# Patient Record
Sex: MALE | Race: WHITE | NOT HISPANIC OR LATINO | Employment: OTHER | URBAN - NONMETROPOLITAN AREA
[De-identification: names, ages, dates, MRNs, and addresses within clinical notes are randomized per-mention and may not be internally consistent; named-entity substitution may affect disease eponyms.]

---

## 2023-12-27 ENCOUNTER — OFFICE VISIT (OUTPATIENT)
Dept: PRIMARY CARE | Facility: CLINIC | Age: 78
End: 2023-12-27
Payer: MEDICARE

## 2023-12-27 ENCOUNTER — LAB (OUTPATIENT)
Dept: LAB | Facility: LAB | Age: 78
End: 2023-12-27
Payer: MEDICARE

## 2023-12-27 VITALS
DIASTOLIC BLOOD PRESSURE: 72 MMHG | OXYGEN SATURATION: 98 % | WEIGHT: 176.8 LBS | TEMPERATURE: 98.4 F | HEART RATE: 82 BPM | BODY MASS INDEX: 25.37 KG/M2 | SYSTOLIC BLOOD PRESSURE: 138 MMHG

## 2023-12-27 DIAGNOSIS — J06.9 VIRAL UPPER RESPIRATORY TRACT INFECTION: ICD-10-CM

## 2023-12-27 DIAGNOSIS — J02.9 PHARYNGITIS, UNSPECIFIED ETIOLOGY: Primary | ICD-10-CM

## 2023-12-27 PROCEDURE — 1126F AMNT PAIN NOTED NONE PRSNT: CPT | Performed by: FAMILY MEDICINE

## 2023-12-27 PROCEDURE — 1036F TOBACCO NON-USER: CPT | Performed by: FAMILY MEDICINE

## 2023-12-27 PROCEDURE — 99213 OFFICE O/P EST LOW 20 MIN: CPT | Performed by: FAMILY MEDICINE

## 2023-12-27 PROCEDURE — 87631 RESP VIRUS 3-5 TARGETS: CPT

## 2023-12-27 PROCEDURE — 87081 CULTURE SCREEN ONLY: CPT

## 2023-12-27 PROCEDURE — 1159F MED LIST DOCD IN RCRD: CPT | Performed by: FAMILY MEDICINE

## 2023-12-27 RX ORDER — ATENOLOL 25 MG/1
25 TABLET ORAL EVERY 24 HOURS
COMMUNITY

## 2023-12-27 RX ORDER — MULTIVIT-MIN/FA/LYCOPEN/LUTEIN .4-300-25
TABLET ORAL
COMMUNITY

## 2023-12-27 RX ORDER — CYCLOBENZAPRINE HCL 10 MG
10 TABLET ORAL 3 TIMES DAILY PRN
COMMUNITY
Start: 2023-05-12 | End: 2023-12-27 | Stop reason: ALTCHOICE

## 2023-12-27 ASSESSMENT — ENCOUNTER SYMPTOMS
DEPRESSION: 0
COUGH: 1
SORE THROAT: 1
OCCASIONAL FEELINGS OF UNSTEADINESS: 0
LOSS OF SENSATION IN FEET: 0

## 2023-12-27 ASSESSMENT — PATIENT HEALTH QUESTIONNAIRE - PHQ9
SUM OF ALL RESPONSES TO PHQ9 QUESTIONS 1 AND 2: 0
1. LITTLE INTEREST OR PLEASURE IN DOING THINGS: NOT AT ALL
2. FEELING DOWN, DEPRESSED OR HOPELESS: NOT AT ALL

## 2023-12-27 ASSESSMENT — PAIN SCALES - GENERAL: PAINLEVEL: 0-NO PAIN

## 2023-12-27 NOTE — PROGRESS NOTES
Subjective   Patient ID: Horacio Venegas is a 78 y.o. male who presents for Sore Throat and Cough.    Sore Throat   Associated symptoms include coughing.   Cough  Associated symptoms include a sore throat.      He presents with 2 days of sore throat and a cough.  He has taken 2 home COVID test and both are negative.  Does not have any ear pain.  Minimal nasal congestion.  He has not had a fever.  He is having company in for the holiday and just wants to be sure he is not can spread anything to anybody.    Review of Systems   HENT:  Positive for sore throat.    Respiratory:  Positive for cough.        Objective   /72   Pulse 82   Temp 36.9 °C (98.4 °F)   Wt 80.2 kg (176 lb 12.8 oz)   SpO2 98%   BMI 25.37 kg/m²     Physical Exam  Vitals reviewed.   Constitutional:       Appearance: He is not toxic-appearing.   HENT:      Right Ear: Tympanic membrane and ear canal normal.      Left Ear: Tympanic membrane and ear canal normal.      Nose: No congestion or rhinorrhea.      Mouth/Throat:      Mouth: Mucous membranes are moist.      Pharynx: Oropharynx is clear.   Eyes:      Conjunctiva/sclera: Conjunctivae normal.   Pulmonary:      Effort: Pulmonary effort is normal. No respiratory distress.      Breath sounds: No wheezing or rhonchi.   Musculoskeletal:      Cervical back: No rigidity.   Lymphadenopathy:      Cervical: No cervical adenopathy.   Neurological:      Mental Status: He is alert.   Psychiatric:         Mood and Affect: Mood normal.         Assessment/Plan   Diagnoses and all orders for this visit:  Pharyngitis, unspecified etiology  Viral upper respiratory tract infection  Likely has a viral URI for the last 2 days.  I want to rest at home drink plenty of fluids and keep his stomach warm.  He will let me know Friday if anything worsens going into the weekend.

## 2023-12-27 NOTE — PATIENT INSTRUCTIONS
Your rapid strep test is negative.  Likely have a viral upper respiratory infection.  Rest at home, keep your stomach warm and let me know if anything worsens over the next 2 days.

## 2023-12-28 LAB
FLUAV RNA RESP QL NAA+PROBE: NOT DETECTED
FLUBV RNA RESP QL NAA+PROBE: NOT DETECTED
RSV RNA RESP QL NAA+PROBE: NOT DETECTED

## 2023-12-29 ENCOUNTER — TELEPHONE (OUTPATIENT)
Dept: PRIMARY CARE | Facility: CLINIC | Age: 78
End: 2023-12-29
Payer: MEDICARE

## 2023-12-29 DIAGNOSIS — J40 BRONCHITIS: Primary | ICD-10-CM

## 2023-12-29 RX ORDER — AZITHROMYCIN 250 MG/1
TABLET, FILM COATED ORAL
Qty: 6 TABLET | Refills: 0 | Status: SHIPPED | OUTPATIENT
Start: 2023-12-29 | End: 2024-01-02

## 2023-12-29 NOTE — TELEPHONE ENCOUNTER
Patient had appt with you this week. He was told to call on Friday if he sore throat was not better. He is no better. He is requesting antibiotic, zpack

## 2023-12-30 LAB — S PYO THROAT QL CULT: NORMAL

## 2024-01-29 ENCOUNTER — OFFICE VISIT (OUTPATIENT)
Dept: PRIMARY CARE | Facility: CLINIC | Age: 79
End: 2024-01-29
Payer: MEDICARE

## 2024-01-29 VITALS
HEART RATE: 58 BPM | OXYGEN SATURATION: 98 % | WEIGHT: 177 LBS | SYSTOLIC BLOOD PRESSURE: 132 MMHG | BODY MASS INDEX: 25.4 KG/M2 | DIASTOLIC BLOOD PRESSURE: 68 MMHG

## 2024-01-29 DIAGNOSIS — R42 VERTIGO: Primary | ICD-10-CM

## 2024-01-29 DIAGNOSIS — R51.9 SCALP PAIN: ICD-10-CM

## 2024-01-29 DIAGNOSIS — R73.9 ELEVATED BLOOD SUGAR: ICD-10-CM

## 2024-01-29 PROBLEM — R07.9 CHEST PAIN: Status: ACTIVE | Noted: 2019-08-29

## 2024-01-29 PROBLEM — R10.9 ABDOMINAL PAIN: Status: ACTIVE | Noted: 2024-01-29

## 2024-01-29 PROBLEM — R00.0 TACHYCARDIA: Status: ACTIVE | Noted: 2024-01-29

## 2024-01-29 PROBLEM — D72.821 MONOCYTOSIS: Status: ACTIVE | Noted: 2024-01-29

## 2024-01-29 LAB — POC HEMOGLOBIN A1C: 5.6 % (ref 4.2–6.5)

## 2024-01-29 PROCEDURE — 1036F TOBACCO NON-USER: CPT | Performed by: FAMILY MEDICINE

## 2024-01-29 PROCEDURE — 1126F AMNT PAIN NOTED NONE PRSNT: CPT | Performed by: FAMILY MEDICINE

## 2024-01-29 PROCEDURE — 1159F MED LIST DOCD IN RCRD: CPT | Performed by: FAMILY MEDICINE

## 2024-01-29 PROCEDURE — 99213 OFFICE O/P EST LOW 20 MIN: CPT | Performed by: FAMILY MEDICINE

## 2024-01-29 ASSESSMENT — PATIENT HEALTH QUESTIONNAIRE - PHQ9
1. LITTLE INTEREST OR PLEASURE IN DOING THINGS: NOT AT ALL
SUM OF ALL RESPONSES TO PHQ9 QUESTIONS 1 AND 2: 0
2. FEELING DOWN, DEPRESSED OR HOPELESS: NOT AT ALL

## 2024-01-29 ASSESSMENT — ENCOUNTER SYMPTOMS
OCCASIONAL FEELINGS OF UNSTEADINESS: 0
LOSS OF SENSATION IN FEET: 0
DEPRESSION: 0

## 2024-01-29 ASSESSMENT — PAIN SCALES - GENERAL: PAINLEVEL: 0-NO PAIN

## 2024-01-29 NOTE — PROGRESS NOTES
Subjective   Patient ID: Horacio Venegas is a 78 y.o. male who presents for Dizziness, Headache, and Hypertension.    HPI   He presents today with just to discuss some issues over the weekend.  He states that he is laying down in bed Saturday and suddenly got very dizzy the room was spinning around.  He sat up for a while spent 2 hours in the chair and was able to back to bed.  This happened 1 other time later in the weekend but was very short-lived.  He has not happened since.    He also number weeks ago had some scalp pain.  He states it did not hurt less he touched the scalp and that lasted for a few hours but then resolved on its own.    He was taking his blood pressure and that it did get up into the 140s but then has been down in the 120s to 130s today and late yesterday.    Review of Systems    Objective   /68   Pulse 58   Wt 80.3 kg (177 lb)   SpO2 98%   BMI 25.40 kg/m²     Physical Exam  Constitutional:       Appearance: Normal appearance.   HENT:      Right Ear: Tympanic membrane and ear canal normal.      Left Ear: Tympanic membrane and ear canal normal.   Neck:      Thyroid: No thyromegaly or thyroid tenderness.      Vascular: No carotid bruit.   Cardiovascular:      Rate and Rhythm: Normal rate and regular rhythm.      Heart sounds: No murmur heard.  Pulmonary:      Effort: Pulmonary effort is normal.      Breath sounds: Normal breath sounds.   Musculoskeletal:      Cervical back: Neck supple.   Neurological:      Mental Status: He is alert.   Psychiatric:         Mood and Affect: Mood normal.         Assessment/Plan   Diagnoses and all orders for this visit:  Vertigo  Scalp pain  Elevated blood sugar  It does sound like he was experiencing vertigo given that laying down in the bed with the head change have precipitated it.  Staying upright resolved.  However, if this occurs again, he will call right away.  Also, if the scalp pain turns, he will call.  Returns he will call.  May be able to  resolve that with acupuncture..

## 2024-02-07 ENCOUNTER — OFFICE VISIT (OUTPATIENT)
Dept: PRIMARY CARE | Facility: CLINIC | Age: 79
End: 2024-02-07
Payer: MEDICARE

## 2024-02-07 ENCOUNTER — LAB (OUTPATIENT)
Dept: LAB | Facility: LAB | Age: 79
End: 2024-02-07
Payer: MEDICARE

## 2024-02-07 VITALS
BODY MASS INDEX: 25.65 KG/M2 | OXYGEN SATURATION: 99 % | SYSTOLIC BLOOD PRESSURE: 126 MMHG | WEIGHT: 179.2 LBS | HEART RATE: 57 BPM | HEIGHT: 70 IN | DIASTOLIC BLOOD PRESSURE: 74 MMHG

## 2024-02-07 DIAGNOSIS — Z86.010 PERSONAL HISTORY OF COLONIC POLYPS: ICD-10-CM

## 2024-02-07 DIAGNOSIS — I47.10 SVT (SUPRAVENTRICULAR TACHYCARDIA) (CMS-HCC): ICD-10-CM

## 2024-02-07 DIAGNOSIS — Z00.00 ROUTINE GENERAL MEDICAL EXAMINATION AT HEALTH CARE FACILITY: Primary | ICD-10-CM

## 2024-02-07 DIAGNOSIS — Z12.5 PROSTATE CANCER SCREENING: ICD-10-CM

## 2024-02-07 DIAGNOSIS — Z00.00 ROUTINE GENERAL MEDICAL EXAMINATION AT HEALTH CARE FACILITY: ICD-10-CM

## 2024-02-07 PROBLEM — R10.9 ABDOMINAL PAIN: Status: RESOLVED | Noted: 2024-01-29 | Resolved: 2024-02-07

## 2024-02-07 PROBLEM — R07.9 CHEST PAIN: Status: RESOLVED | Noted: 2019-08-29 | Resolved: 2024-02-07

## 2024-02-07 LAB
ALBUMIN SERPL BCP-MCNC: 4.5 G/DL (ref 3.4–5)
ALP SERPL-CCNC: 80 U/L (ref 33–136)
ALT SERPL W P-5'-P-CCNC: 18 U/L (ref 10–52)
ANION GAP SERPL CALC-SCNC: 11 MMOL/L (ref 10–20)
AST SERPL W P-5'-P-CCNC: 24 U/L (ref 9–39)
BILIRUB SERPL-MCNC: 1.2 MG/DL (ref 0–1.2)
BUN SERPL-MCNC: 25 MG/DL (ref 6–23)
CALCIUM SERPL-MCNC: 9.9 MG/DL (ref 8.6–10.3)
CHLORIDE SERPL-SCNC: 104 MMOL/L (ref 98–107)
CHOLEST SERPL-MCNC: 166 MG/DL (ref 0–199)
CHOLESTEROL/HDL RATIO: 2.2
CO2 SERPL-SCNC: 31 MMOL/L (ref 21–32)
CREAT SERPL-MCNC: 1.05 MG/DL (ref 0.5–1.3)
EGFRCR SERPLBLD CKD-EPI 2021: 73 ML/MIN/1.73M*2
GLUCOSE SERPL-MCNC: 90 MG/DL (ref 74–99)
HDLC SERPL-MCNC: 74.9 MG/DL
LDLC SERPL CALC-MCNC: 78 MG/DL
NON HDL CHOLESTEROL: 91 MG/DL (ref 0–149)
POTASSIUM SERPL-SCNC: 4.7 MMOL/L (ref 3.5–5.3)
PROT SERPL-MCNC: 7.9 G/DL (ref 6.4–8.2)
PSA SERPL-MCNC: 0.93 NG/ML
SODIUM SERPL-SCNC: 141 MMOL/L (ref 136–145)
TRIGL SERPL-MCNC: 66 MG/DL (ref 0–149)
TSH SERPL-ACNC: 2.17 MIU/L (ref 0.44–3.98)
VLDL: 13 MG/DL (ref 0–40)

## 2024-02-07 PROCEDURE — 1160F RVW MEDS BY RX/DR IN RCRD: CPT | Performed by: FAMILY MEDICINE

## 2024-02-07 PROCEDURE — 36415 COLL VENOUS BLD VENIPUNCTURE: CPT

## 2024-02-07 PROCEDURE — 1126F AMNT PAIN NOTED NONE PRSNT: CPT | Performed by: FAMILY MEDICINE

## 2024-02-07 PROCEDURE — 1170F FXNL STATUS ASSESSED: CPT | Performed by: FAMILY MEDICINE

## 2024-02-07 PROCEDURE — 1036F TOBACCO NON-USER: CPT | Performed by: FAMILY MEDICINE

## 2024-02-07 PROCEDURE — 1159F MED LIST DOCD IN RCRD: CPT | Performed by: FAMILY MEDICINE

## 2024-02-07 PROCEDURE — 99215 OFFICE O/P EST HI 40 MIN: CPT | Performed by: FAMILY MEDICINE

## 2024-02-07 PROCEDURE — G0439 PPPS, SUBSEQ VISIT: HCPCS | Performed by: FAMILY MEDICINE

## 2024-02-07 PROCEDURE — G0103 PSA SCREENING: HCPCS

## 2024-02-07 ASSESSMENT — ENCOUNTER SYMPTOMS
ACTIVITY CHANGE: 0
LOSS OF SENSATION IN FEET: 0
DIFFICULTY URINATING: 0
HEADACHES: 0
OCCASIONAL FEELINGS OF UNSTEADINESS: 0
CHEST TIGHTNESS: 0
NERVOUS/ANXIOUS: 0
PALPITATIONS: 0
BLOOD IN STOOL: 0
DYSPHORIC MOOD: 0
DIZZINESS: 0
CONSTIPATION: 0
DIARRHEA: 0
DEPRESSION: 0
COUGH: 0
SHORTNESS OF BREATH: 0

## 2024-02-07 ASSESSMENT — ACTIVITIES OF DAILY LIVING (ADL)
GROCERY_SHOPPING: INDEPENDENT
MANAGING_FINANCES: INDEPENDENT
DOING_HOUSEWORK: INDEPENDENT
DRESSING: INDEPENDENT
BATHING: INDEPENDENT
TAKING_MEDICATION: INDEPENDENT

## 2024-02-07 ASSESSMENT — PAIN SCALES - GENERAL: PAINLEVEL: 0-NO PAIN

## 2024-02-07 ASSESSMENT — LIFESTYLE VARIABLES
HOW MANY STANDARD DRINKS CONTAINING ALCOHOL DO YOU HAVE ON A TYPICAL DAY: PATIENT DOES NOT DRINK
SKIP TO QUESTIONS 9-10: 1
HOW OFTEN DO YOU HAVE A DRINK CONTAINING ALCOHOL: NEVER
AUDIT-C TOTAL SCORE: 0
HOW OFTEN DO YOU HAVE SIX OR MORE DRINKS ON ONE OCCASION: NEVER

## 2024-02-07 NOTE — PATIENT INSTRUCTIONS
Doing well.  If you do get the rapid heartrate, bear down or cough hard, which will help slow your heart rate down.   Keep your follow up for colonoscopy.

## 2024-02-07 NOTE — PROGRESS NOTES
"Subjective   Reason for Visit: Horacio Venegas is an 78 y.o. male here for a Medicare Wellness visit.     Past Medical, Surgical, and Family History reviewed and updated in chart.    Reviewed all medications by prescribing practitioner or clinical pharmacist (such as prescriptions, OTCs, herbal therapies and supplements) and documented in the medical record.    HPI  Here for wellness exam.  Overall, feeling well.  He is no longer having the dizziness.  He gets occasional headache but overall feels well.  No trouble with his medications.  He states he is up-to-date on his colonoscopy, due in a year or 2.    Patient Care Team:  Austyn Hubbard MD as PCP - General (Family Medicine)     Review of Systems   Constitutional:  Negative for activity change.   Respiratory:  Negative for cough, chest tightness and shortness of breath.    Cardiovascular:  Negative for chest pain, palpitations and leg swelling.   Gastrointestinal:  Negative for blood in stool, constipation and diarrhea.   Genitourinary:  Negative for difficulty urinating.   Neurological:  Negative for dizziness and headaches.   Psychiatric/Behavioral:  Negative for dysphoric mood. The patient is not nervous/anxious.        Objective   Vitals:  /74   Pulse 57   Ht 1.778 m (5' 10\")   Wt 81.3 kg (179 lb 3.2 oz)   SpO2 99%   BMI 25.71 kg/m²       Physical Exam  Constitutional:       Appearance: Normal appearance.   HENT:      Right Ear: Tympanic membrane and ear canal normal.      Left Ear: Tympanic membrane and ear canal normal.      Nose: Nose normal.      Mouth/Throat:      Mouth: Mucous membranes are moist.      Pharynx: Oropharynx is clear. No posterior oropharyngeal erythema.   Eyes:      Conjunctiva/sclera: Conjunctivae normal.   Neck:      Thyroid: No thyromegaly or thyroid tenderness.      Vascular: No carotid bruit.   Cardiovascular:      Rate and Rhythm: Normal rate and regular rhythm.      Heart sounds: No murmur heard.  Pulmonary:      " Effort: Pulmonary effort is normal.      Breath sounds: Normal breath sounds.   Musculoskeletal:      Cervical back: Neck supple.   Lymphadenopathy:      Cervical: No cervical adenopathy.   Skin:     General: Skin is warm and dry.   Neurological:      General: No focal deficit present.      Mental Status: He is alert.   Psychiatric:         Mood and Affect: Mood normal.         Assessment/Plan   Problem List Items Addressed This Visit       Personal history of colonic polyps    SVT (supraventricular tachycardia)    Overview     Formatting of this note might be different from the original. Paroxysmal.Controlled on atenolol.          Other Visit Diagnoses       Routine general medical examination at health care facility    -  Primary        Overall, doing well.  He will continue his current medications.  I did instruct him to cough hard or bear down if he does get palpitations or rapid heart rate.

## 2024-04-01 ENCOUNTER — OFFICE VISIT (OUTPATIENT)
Dept: PRIMARY CARE | Facility: CLINIC | Age: 79
End: 2024-04-01
Payer: MEDICARE

## 2024-04-01 VITALS
OXYGEN SATURATION: 98 % | SYSTOLIC BLOOD PRESSURE: 140 MMHG | HEART RATE: 53 BPM | WEIGHT: 182.2 LBS | BODY MASS INDEX: 26.14 KG/M2 | DIASTOLIC BLOOD PRESSURE: 78 MMHG

## 2024-04-01 DIAGNOSIS — H69.91 DYSFUNCTION OF RIGHT EUSTACHIAN TUBE: Primary | ICD-10-CM

## 2024-04-01 PROCEDURE — 1036F TOBACCO NON-USER: CPT | Performed by: FAMILY MEDICINE

## 2024-04-01 PROCEDURE — 99213 OFFICE O/P EST LOW 20 MIN: CPT | Performed by: FAMILY MEDICINE

## 2024-04-01 PROCEDURE — 1125F AMNT PAIN NOTED PAIN PRSNT: CPT | Performed by: FAMILY MEDICINE

## 2024-04-01 PROCEDURE — 1159F MED LIST DOCD IN RCRD: CPT | Performed by: FAMILY MEDICINE

## 2024-04-01 PROCEDURE — 1160F RVW MEDS BY RX/DR IN RCRD: CPT | Performed by: FAMILY MEDICINE

## 2024-04-01 RX ORDER — PREDNISONE 20 MG/1
40 TABLET ORAL DAILY
Qty: 10 TABLET | Refills: 0 | Status: SHIPPED | OUTPATIENT
Start: 2024-04-01 | End: 2024-04-09 | Stop reason: ALTCHOICE

## 2024-04-01 ASSESSMENT — ENCOUNTER SYMPTOMS
DEPRESSION: 0
OCCASIONAL FEELINGS OF UNSTEADINESS: 0
LOSS OF SENSATION IN FEET: 0

## 2024-04-01 ASSESSMENT — PAIN SCALES - GENERAL: PAINLEVEL: 4

## 2024-04-01 ASSESSMENT — LIFESTYLE VARIABLES
SKIP TO QUESTIONS 9-10: 1
AUDIT-C TOTAL SCORE: 0
HOW MANY STANDARD DRINKS CONTAINING ALCOHOL DO YOU HAVE ON A TYPICAL DAY: PATIENT DOES NOT DRINK
HOW OFTEN DO YOU HAVE SIX OR MORE DRINKS ON ONE OCCASION: NEVER
HOW OFTEN DO YOU HAVE A DRINK CONTAINING ALCOHOL: NEVER

## 2024-04-01 ASSESSMENT — PATIENT HEALTH QUESTIONNAIRE - PHQ9
2. FEELING DOWN, DEPRESSED OR HOPELESS: NOT AT ALL
1. LITTLE INTEREST OR PLEASURE IN DOING THINGS: NOT AT ALL
SUM OF ALL RESPONSES TO PHQ9 QUESTIONS 1 AND 2: 0

## 2024-04-01 NOTE — PROGRESS NOTES
Subjective   Patient ID: Horacio Venegas is a 78 y.o. male who presents for Sore Throat and Earache.    Over the last week or more, has had some discomfort in the right side of his neck.  He states when he swallows or opens his mouth pain will come into the neck and go up into his ear.  He states he had this before and he thinks maybe he took a Z-Art for it.  States he has used Flonase in the past which she did not think was effective.  No nasal congestion.  No fever.  Otherwise feeling well.  Had trouble sleeping last night because of the pain.  He states he tried heat on this has been no help.         Review of Systems    Objective   /78   Pulse 53   Wt 82.6 kg (182 lb 3.2 oz)   SpO2 98%   BMI 26.14 kg/m²     Physical Exam  Vitals reviewed.   Constitutional:       Appearance: He is not toxic-appearing.   HENT:      Right Ear: Tympanic membrane and ear canal normal.      Nose: No congestion or rhinorrhea.      Mouth/Throat:      Mouth: Mucous membranes are moist.      Pharynx: Oropharynx is clear.   Eyes:      Conjunctiva/sclera: Conjunctivae normal.   Pulmonary:      Effort: Pulmonary effort is normal.   Lymphadenopathy:      Cervical: No cervical adenopathy.   Neurological:      Mental Status: He is alert.   Psychiatric:         Mood and Affect: Mood normal.         Assessment/Plan   Diagnoses and all orders for this visit:  Dysfunction of right eustachian tube  -     predniSONE (Deltasone) 20 mg tablet; Take 2 tablets (40 mg) by mouth once daily for 5 days.  I believe this is related to eustachian tube dysfunction since when he swallows the pain goes up towards the ear.  Also when he is eating he has problems.  He is wanting the Flonase we will use the prednisone and let me know if that improving over the next 2 or 3 days in which case we could potentially add a Z-Art, which she states is warfarin for this in the past.

## 2024-04-04 ENCOUNTER — TELEPHONE (OUTPATIENT)
Dept: PRIMARY CARE | Facility: CLINIC | Age: 79
End: 2024-04-04
Payer: MEDICARE

## 2024-04-04 DIAGNOSIS — J02.9 PHARYNGITIS, UNSPECIFIED ETIOLOGY: Primary | ICD-10-CM

## 2024-04-04 RX ORDER — AZITHROMYCIN 250 MG/1
TABLET, FILM COATED ORAL
Qty: 6 TABLET | Refills: 0 | Status: SHIPPED | OUTPATIENT
Start: 2024-04-04 | End: 2024-04-09 | Stop reason: ALTCHOICE

## 2024-04-09 ENCOUNTER — OFFICE VISIT (OUTPATIENT)
Dept: PRIMARY CARE | Facility: CLINIC | Age: 79
End: 2024-04-09
Payer: MEDICARE

## 2024-04-09 VITALS
BODY MASS INDEX: 25.2 KG/M2 | WEIGHT: 175.6 LBS | DIASTOLIC BLOOD PRESSURE: 60 MMHG | HEART RATE: 56 BPM | OXYGEN SATURATION: 96 % | TEMPERATURE: 98.5 F | SYSTOLIC BLOOD PRESSURE: 136 MMHG

## 2024-04-09 DIAGNOSIS — R43.9 TASTE DISORDER: ICD-10-CM

## 2024-04-09 DIAGNOSIS — H69.91 DYSFUNCTION OF RIGHT EUSTACHIAN TUBE: Primary | ICD-10-CM

## 2024-04-09 PROCEDURE — 1159F MED LIST DOCD IN RCRD: CPT | Performed by: FAMILY MEDICINE

## 2024-04-09 PROCEDURE — 1125F AMNT PAIN NOTED PAIN PRSNT: CPT | Performed by: FAMILY MEDICINE

## 2024-04-09 PROCEDURE — 1036F TOBACCO NON-USER: CPT | Performed by: FAMILY MEDICINE

## 2024-04-09 PROCEDURE — 99213 OFFICE O/P EST LOW 20 MIN: CPT | Performed by: FAMILY MEDICINE

## 2024-04-09 PROCEDURE — 1160F RVW MEDS BY RX/DR IN RCRD: CPT | Performed by: FAMILY MEDICINE

## 2024-04-09 ASSESSMENT — ENCOUNTER SYMPTOMS
OCCASIONAL FEELINGS OF UNSTEADINESS: 0
LOSS OF SENSATION IN FEET: 0
DEPRESSION: 0

## 2024-04-09 ASSESSMENT — PAIN SCALES - GENERAL: PAINLEVEL: 3

## 2024-04-09 NOTE — PATIENT INSTRUCTIONS
Add flonase, 2 sprays each nostril once a day. Schedule with ENT.   Will see how things go this next week on the Flonase and the finish of the Zpack.

## 2024-04-15 ASSESSMENT — ENCOUNTER SYMPTOMS
SORE THROAT: 1
NECK PAIN: 1
HEADACHES: 1

## 2024-04-19 ENCOUNTER — OFFICE VISIT (OUTPATIENT)
Dept: OTOLARYNGOLOGY | Facility: CLINIC | Age: 79
End: 2024-04-19
Payer: MEDICARE

## 2024-04-19 VITALS — TEMPERATURE: 97.3 F | BODY MASS INDEX: 24.08 KG/M2 | HEIGHT: 70 IN | WEIGHT: 168.2 LBS

## 2024-04-19 DIAGNOSIS — H92.01 RIGHT EAR PAIN: Primary | ICD-10-CM

## 2024-04-19 DIAGNOSIS — Z00.00 HEALTH CARE MAINTENANCE: ICD-10-CM

## 2024-04-19 DIAGNOSIS — R07.0 THROAT PAIN: ICD-10-CM

## 2024-04-19 PROCEDURE — 31575 DIAGNOSTIC LARYNGOSCOPY: CPT | Performed by: OTOLARYNGOLOGY

## 2024-04-19 PROCEDURE — 1160F RVW MEDS BY RX/DR IN RCRD: CPT | Performed by: OTOLARYNGOLOGY

## 2024-04-19 PROCEDURE — 99203 OFFICE O/P NEW LOW 30 MIN: CPT | Performed by: OTOLARYNGOLOGY

## 2024-04-19 PROCEDURE — 1159F MED LIST DOCD IN RCRD: CPT | Performed by: OTOLARYNGOLOGY

## 2024-04-19 PROCEDURE — 1036F TOBACCO NON-USER: CPT | Performed by: OTOLARYNGOLOGY

## 2024-04-19 NOTE — PROGRESS NOTES
"HPI  Horacio Venegas is a 78 y.o. male with about a month of right-sided ear pain and sore throat.  He also has unusual facial pain extending to the head.  This is older.  He has been on antibiotics and prednisone and symptoms persist.  Voice is strong.  Breathing well.  No hemoptysis.      History reviewed. No pertinent past medical history.         Medications:     Current Outpatient Medications:     aspirin 81 mg capsule, Take 81 mg by mouth once every 24 hours., Disp: , Rfl:     atenolol (Tenormin) 25 mg tablet, Take 1 tablet (25 mg) by mouth once every 24 hours., Disp: , Rfl:     multivitamin with minerals iron-free (Centrum Silver), as directed Orally, Disp: , Rfl:      Allergies:  Allergies   Allergen Reactions    Penicillin Dizziness    Sulfa (Sulfonamide Antibiotics) Dizziness        Physical Exam:  Last Recorded Vitals  Temperature 36.3 °C (97.3 °F), height 1.778 m (5' 10\"), weight 76.3 kg (168 lb 3.2 oz).  General:     General appearance: Well-developed, well-nourished in no acute distress.       Voice:  normal       Head/face: Normal appearance; nontender to palpation     Facial nerve function: Normal and symmetric bilaterally.    Oral/oropharynx:     Oral vestibule: Normal labial and gingival mucosa     Tongue/floor of mouth: Normal without lesion     Oropharynx: Clear.  No lesions present of the hard/soft palate, posterior pharynx.  2+ tonsil tissue.  Appears symmetric.  Not firm to palpation.  Base of tongue soft to palpation.    Neck:     Neck: Normal appearance, trachea midline     Salivary glands: Normal to palpation bilaterally     Lymph nodes: No cervical lymphadenopathy to palpation     Thyroid: No thyromegaly.  No palpable nodules     Range of motion: Normal    Neurological:     Cortical functions: Alert and oriented x3, appropriate affect       Larynx/hypopharynx:     Laryngeal findings: Mirror exam inadequate or limited secondary to enlarged base of tongue and/or excessive gagging.  " Flexible laryngoscopy performed secondary to inadequate mirror examination.  Verbal informed consent the flexible laryngoscope was passed through the nasal cavity.  Normal epiglottis, aryepiglottic folds, arytenoids, false vocal cords, true vocal cords.  Normal vocal cord mobility bilaterally was identified.  No mucosal masses or lesions.  Question possible mild asymmetry of the base of tongue right greater than left    Nasopharynx:     Nasopharynx: Normal    Ear:     Ear canal: Normal bilaterally     Tympanic membrane: Intact and mobile bilaterally     Pinna: Normal bilaterally     Hearing:  Gross hearing assessment normal by voice    Nose:     Visualized using: Anterior rhinoscopy     Nasopharynx: Inadequate mirror exam secondary to gag, anatomy.       Nasal dorsum: Nontraumatic midline appearance     Septum: Midline     Inferior turbinates: Normally sized     Mucosa: Bilateral, pink, normal appearing       ASSESSMENT/PLAN:  I do not see obvious explanation for her right throat pain and referred ear pain.  Concerned about the unilateral nature.  Concerned about the throat/ear combination.  I do not feel base of tongue mass but I recommended that we proceed with CT scan of the neck with contrast to better evaluate.  I recommended reflux medication in the meantime.  Follow-up after scan        Ashutosh Tomas MD

## 2024-04-22 ENCOUNTER — LAB (OUTPATIENT)
Dept: LAB | Facility: LAB | Age: 79
End: 2024-04-22
Payer: MEDICARE

## 2024-04-22 DIAGNOSIS — Z00.00 HEALTH CARE MAINTENANCE: ICD-10-CM

## 2024-04-22 LAB
CREAT SERPL-MCNC: 1.1 MG/DL (ref 0.5–1.3)
EGFRCR SERPLBLD CKD-EPI 2021: 69 ML/MIN/1.73M*2

## 2024-04-22 PROCEDURE — 36415 COLL VENOUS BLD VENIPUNCTURE: CPT

## 2024-04-23 ENCOUNTER — APPOINTMENT (OUTPATIENT)
Dept: OTOLARYNGOLOGY | Facility: CLINIC | Age: 79
End: 2024-04-23
Payer: MEDICARE

## 2024-04-24 ENCOUNTER — HOSPITAL ENCOUNTER (OUTPATIENT)
Dept: RADIOLOGY | Facility: HOSPITAL | Age: 79
Discharge: HOME | End: 2024-04-24
Payer: MEDICARE

## 2024-04-24 DIAGNOSIS — R07.0 THROAT PAIN: ICD-10-CM

## 2024-04-24 DIAGNOSIS — H92.01 RIGHT EAR PAIN: ICD-10-CM

## 2024-04-24 PROCEDURE — 70491 CT SOFT TISSUE NECK W/DYE: CPT

## 2024-04-24 PROCEDURE — 2550000001 HC RX 255 CONTRASTS: Performed by: OTOLARYNGOLOGY

## 2024-04-24 PROCEDURE — 70491 CT SOFT TISSUE NECK W/DYE: CPT | Performed by: RADIOLOGY

## 2024-04-24 RX ADMIN — IOHEXOL 75 ML: 350 INJECTION, SOLUTION INTRAVENOUS at 11:29

## 2024-05-07 ENCOUNTER — OFFICE VISIT (OUTPATIENT)
Dept: OTOLARYNGOLOGY | Facility: CLINIC | Age: 79
End: 2024-05-07
Payer: MEDICARE

## 2024-05-07 VITALS — BODY MASS INDEX: 23.91 KG/M2 | HEIGHT: 70 IN | TEMPERATURE: 96.8 F | WEIGHT: 167 LBS

## 2024-05-07 DIAGNOSIS — R07.0 THROAT PAIN: ICD-10-CM

## 2024-05-07 DIAGNOSIS — M54.2 NECK PAIN: ICD-10-CM

## 2024-05-07 DIAGNOSIS — H92.01 RIGHT EAR PAIN: Primary | ICD-10-CM

## 2024-05-07 PROCEDURE — 1160F RVW MEDS BY RX/DR IN RCRD: CPT | Performed by: OTOLARYNGOLOGY

## 2024-05-07 PROCEDURE — 31575 DIAGNOSTIC LARYNGOSCOPY: CPT | Performed by: OTOLARYNGOLOGY

## 2024-05-07 PROCEDURE — 1036F TOBACCO NON-USER: CPT | Performed by: OTOLARYNGOLOGY

## 2024-05-07 PROCEDURE — 99213 OFFICE O/P EST LOW 20 MIN: CPT | Performed by: OTOLARYNGOLOGY

## 2024-05-07 PROCEDURE — 1159F MED LIST DOCD IN RCRD: CPT | Performed by: OTOLARYNGOLOGY

## 2024-05-07 NOTE — PROGRESS NOTES
"HPI  Horacio Venegas is a 78 y.o. male follow-up CT scan of the neck.  No lymphadenopathy or masses seen.  My review of the images demonstrated what appeared to be some small tonsil stones in the left tonsil area inferiorly.  He is using antiacid.  He is improving slightly    Prior history: With about a month of right-sided ear pain and sore throat.  He also has unusual facial pain extending to the head.  This is older.  He has been on antibiotics and prednisone and symptoms persist.  Voice is strong.  Breathing well.  No hemoptysis.      History reviewed. No pertinent past medical history.         Medications:     Current Outpatient Medications:     aspirin 81 mg capsule, Take 81 mg by mouth once every 24 hours., Disp: , Rfl:     atenolol (Tenormin) 25 mg tablet, Take 1 tablet (25 mg) by mouth once every 24 hours., Disp: , Rfl:     multivitamin with minerals iron-free (Centrum Silver), as directed Orally, Disp: , Rfl:      Allergies:  Allergies   Allergen Reactions    Penicillin Dizziness    Sulfa (Sulfonamide Antibiotics) Dizziness        Physical Exam:  Last Recorded Vitals  Temperature 36 °C (96.8 °F), height 1.778 m (5' 10\"), weight 75.8 kg (167 lb).  General:     General appearance: Well-developed, well-nourished in no acute distress.       Voice:  normal       Head/face: Normal appearance; nontender to palpation     Facial nerve function: Normal and symmetric bilaterally.    Oral/oropharynx:     Oral vestibule: Normal labial and gingival mucosa     Tongue/floor of mouth: Normal without lesion     Oropharynx: Clear.  No lesions present of the hard/soft palate, posterior pharynx.  2+ tonsil tissue.  Appears symmetric.  Not firm to palpation.  Base of tongue soft to palpation.    Neck:     Neck: Normal appearance, trachea midline     Salivary glands: Normal to palpation bilaterally     Lymph nodes: No cervical lymphadenopathy to palpation     Thyroid: No thyromegaly.  No palpable nodules     Range of motion: " Normal    Neurological:     Cortical functions: Alert and oriented x3, appropriate affect       Larynx/hypopharynx:     Laryngeal findings: Mirror exam inadequate or limited secondary to enlarged base of tongue and/or excessive gagging.  Flexible laryngoscopy performed secondary to inadequate mirror examination.  Verbal informed consent the flexible laryngoscope was passed through the nasal cavity.  Normal epiglottis, aryepiglottic folds, arytenoids, false vocal cords, true vocal cords.  Normal vocal cord mobility bilaterally was identified.  No mucosal masses or lesions.  Appears more symmetric today  Nasopharynx:     Nasopharynx: Normal    Ear:     Ear canal: Normal bilaterally     Tympanic membrane: Intact and mobile bilaterally     Pinna: Normal bilaterally     Hearing:  Gross hearing assessment normal by voice    Nose:     Visualized using: Anterior rhinoscopy     Nasopharynx: Inadequate mirror exam secondary to gag, anatomy.       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  There may be also a neuralgia component.  He does not appear to have any masses or lesions.  We discussed all the above.  I have asked him to continue the antiacid.  I have asked him to reach out to Dr. Hubbard regarding the arthritis and see if more might be considered including possible physical therapy.  I will see him back in 6 weeks, sooner as needed        Ashutosh Tomas MD

## 2024-05-17 ENCOUNTER — OFFICE VISIT (OUTPATIENT)
Dept: PRIMARY CARE | Facility: CLINIC | Age: 79
End: 2024-05-17
Payer: MEDICARE

## 2024-05-17 VITALS
HEART RATE: 62 BPM | WEIGHT: 165.8 LBS | SYSTOLIC BLOOD PRESSURE: 118 MMHG | DIASTOLIC BLOOD PRESSURE: 60 MMHG | BODY MASS INDEX: 23.79 KG/M2 | OXYGEN SATURATION: 98 %

## 2024-05-17 DIAGNOSIS — H92.01 RIGHT EAR PAIN: ICD-10-CM

## 2024-05-17 DIAGNOSIS — R43.9 TASTE DISORDER: Primary | ICD-10-CM

## 2024-05-17 DIAGNOSIS — J02.9 SORE THROAT: ICD-10-CM

## 2024-05-17 PROBLEM — H69.91 DYSFUNCTION OF RIGHT EUSTACHIAN TUBE: Status: ACTIVE | Noted: 2024-05-17

## 2024-05-17 PROCEDURE — 1159F MED LIST DOCD IN RCRD: CPT | Performed by: FAMILY MEDICINE

## 2024-05-17 PROCEDURE — 1126F AMNT PAIN NOTED NONE PRSNT: CPT | Performed by: FAMILY MEDICINE

## 2024-05-17 PROCEDURE — 99213 OFFICE O/P EST LOW 20 MIN: CPT | Performed by: FAMILY MEDICINE

## 2024-05-17 PROCEDURE — 1160F RVW MEDS BY RX/DR IN RCRD: CPT | Performed by: FAMILY MEDICINE

## 2024-05-17 PROCEDURE — 1036F TOBACCO NON-USER: CPT | Performed by: FAMILY MEDICINE

## 2024-05-17 RX ORDER — OMEPRAZOLE 20 MG/1
20 TABLET, DELAYED RELEASE ORAL
COMMUNITY

## 2024-05-17 ASSESSMENT — PAIN SCALES - GENERAL: PAINLEVEL: 0-NO PAIN

## 2024-05-17 NOTE — PATIENT INSTRUCTIONS
Continue the prilosec for now.   Let's the Abel Coreas herb, 2 twice a day, and let me know in two weeks how you're doing. We can adjust or change herbs according to your response.

## 2024-05-17 NOTE — PROGRESS NOTES
Subjective   Patient ID: Horacio Venegas is a 78 y.o. male who presents for f/up Dr. Tomas.    HPI   He presents today in follow-up with Dr. Tomas office.  He has been being treated with omeprazole which has helped with this sore throat and ear pain.  He had a CT scan that did show some degenerative disease of the spine.  He is not having neck pain.  I Dr. Tomas, he states, also told him that this might represent a neuropathy.    He continues to have trouble with taste.  He states he has some taste with things do not taste right anymore.    Review of Systems    Objective   /60   Pulse 62   Wt 75.2 kg (165 lb 12.8 oz)   SpO2 98%   BMI 23.79 kg/m²     Physical Exam  He is alert, no apparent distress, his affect is pleasant.  Respirations are easy.  Heart has a regular rate and rhythm.  His tongue is normal color but does have some thicker coating in the center of the tongue.    Assessment/Plan   Diagnoses and all orders for this visit:  Taste disorder  Sore throat  Right ear pain  Will use Abel Sha, 2 twice a day, and he let me know how he does over the next 2 weeks.  We can adjust these or changes then as needed.  Since his symptoms are improving with the omeprazole this may be a stomach issue and supporting the stomach Mouradian should help.  He will continue the omeprazole for now.

## 2024-05-31 ENCOUNTER — TELEPHONE (OUTPATIENT)
Dept: PRIMARY CARE | Facility: CLINIC | Age: 79
End: 2024-05-31
Payer: MEDICARE

## 2024-06-03 ENCOUNTER — TELEPHONE (OUTPATIENT)
Dept: PRIMARY CARE | Facility: CLINIC | Age: 79
End: 2024-06-03
Payer: MEDICARE

## 2024-06-03 NOTE — TELEPHONE ENCOUNTER
Spoke with pt.  He is out of the herbs.  He is willing to take 3 tabs BID, and wants to know how long you want him to take this for?  Also, how many tabs are in a bottle, so we can make sure he has enough.

## 2024-06-03 NOTE — TELEPHONE ENCOUNTER
Pt called in again today in regards to the herb Abel briscoe that you had him take for 2 weeks for his taste being off.  He did state it worked a little, but not much.  What should he do now?  Continue taking it or just stop?  Please advise.

## 2024-06-18 ENCOUNTER — APPOINTMENT (OUTPATIENT)
Dept: OTOLARYNGOLOGY | Facility: CLINIC | Age: 79
End: 2024-06-18
Payer: MEDICARE

## 2024-06-18 VITALS — WEIGHT: 168 LBS | HEIGHT: 70 IN | BODY MASS INDEX: 24.05 KG/M2 | TEMPERATURE: 96.8 F

## 2024-06-18 DIAGNOSIS — H92.01 RIGHT EAR PAIN: Primary | ICD-10-CM

## 2024-06-18 DIAGNOSIS — R07.0 THROAT PAIN: ICD-10-CM

## 2024-06-18 DIAGNOSIS — M54.2 NECK PAIN: ICD-10-CM

## 2024-06-18 PROCEDURE — 1159F MED LIST DOCD IN RCRD: CPT | Performed by: OTOLARYNGOLOGY

## 2024-06-18 PROCEDURE — 99213 OFFICE O/P EST LOW 20 MIN: CPT | Performed by: OTOLARYNGOLOGY

## 2024-06-18 PROCEDURE — 1160F RVW MEDS BY RX/DR IN RCRD: CPT | Performed by: OTOLARYNGOLOGY

## 2024-06-18 PROCEDURE — 1036F TOBACCO NON-USER: CPT | Performed by: OTOLARYNGOLOGY

## 2024-06-18 NOTE — PROGRESS NOTES
"HPI  Horacio Venegas is a 78 y.o. male follow-up ear pain face pain.  No further pain.  He has some mild congestion in his throat but other than that is feeling normal.  He continues the antiacid.  He started some herbal supplements for his sense of taste    Prior history: Follow-up CT scan of the neck.  No lymphadenopathy or masses seen.  My review of the images demonstrated what appeared to be some small tonsil stones in the left tonsil area inferiorly.  He is using antiacid.  He is improving slightly    Prior history: With about a month of right-sided ear pain and sore throat.  He also has unusual facial pain extending to the head.  This is older.  He has been on antibiotics and prednisone and symptoms persist.  Voice is strong.  Breathing well.  No hemoptysis.      History reviewed. No pertinent past medical history.         Medications:     Current Outpatient Medications:     aspirin 81 mg capsule, Take 81 mg by mouth once every 24 hours., Disp: , Rfl:     atenolol (Tenormin) 25 mg tablet, Take 1 tablet (25 mg) by mouth once every 24 hours., Disp: , Rfl:     multivitamin with minerals iron-free (Centrum Silver), as directed Orally, Disp: , Rfl:     omeprazole OTC (PriLOSEC OTC) 20 mg EC tablet, Take 1 tablet (20 mg) by mouth once daily in the morning. Take before meals. Do not crush, chew, or split., Disp: , Rfl:      Allergies:  Allergies   Allergen Reactions    Penicillin Dizziness    Sulfa (Sulfonamide Antibiotics) Dizziness        Physical Exam:  Last Recorded Vitals  Temperature 36 °C (96.8 °F), height 1.778 m (5' 10\"), weight 76.2 kg (168 lb).  General:     General appearance: Well-developed, well-nourished in no acute distress.       Voice:  normal       Head/face: Normal appearance; nontender to palpation     Facial nerve function: Normal and symmetric bilaterally.    Oral/oropharynx:     Oral vestibule: Normal labial and gingival mucosa     Tongue/floor of mouth: Normal without lesion     Oropharynx: " Clear.  No lesions present of the hard/soft palate, posterior pharynx.  2+ tonsil tissue.  Appears symmetric.  Not firm to palpation.  Base of tongue soft to palpation.    Neck:     Neck: Normal appearance, trachea midline     Salivary glands: Normal to palpation bilaterally     Lymph nodes: No cervical lymphadenopathy to palpation     Thyroid: No thyromegaly.  No palpable nodules     Range of motion: Normal    Neurological:     Cortical functions: Alert and oriented x3, appropriate affect       Larynx/hypopharynx:     Laryngeal findings: Mirror exam inadequate or limited secondary to enlarged base of tongue and/or excessive gagging.      Ear:     Ear canal: Normal bilaterally     Tympanic membrane: Intact and mobile bilaterally     Pinna: Normal bilaterally     Hearing:  Gross hearing assessment normal by voice    Nose:     Visualized using: Anterior rhinoscopy     Nasopharynx: Inadequate mirror exam secondary to gag, anatomy.       Nasal dorsum: Nontraumatic midline appearance     Septum: Midline     Inferior turbinates: Normally sized     Mucosa: Bilateral, pink, normal appearing       ASSESSMENT/PLAN:  He seems to be doing better at this point.  I recommended that he continue his antacid for a couple months and then stop it as a trial.  He will come back in October for repeat exam and sooner as needed with symptoms      Ashutosh Tomas MD

## 2024-10-08 ENCOUNTER — APPOINTMENT (OUTPATIENT)
Dept: OTOLARYNGOLOGY | Facility: CLINIC | Age: 79
End: 2024-10-08
Payer: MEDICARE

## 2024-10-08 VITALS — TEMPERATURE: 96.8 F | WEIGHT: 172 LBS | BODY MASS INDEX: 24.62 KG/M2 | HEIGHT: 70 IN

## 2024-10-08 DIAGNOSIS — R07.0 THROAT PAIN: Primary | ICD-10-CM

## 2024-10-08 PROCEDURE — 1159F MED LIST DOCD IN RCRD: CPT | Performed by: OTOLARYNGOLOGY

## 2024-10-08 PROCEDURE — 99213 OFFICE O/P EST LOW 20 MIN: CPT | Performed by: OTOLARYNGOLOGY

## 2024-10-08 PROCEDURE — 1036F TOBACCO NON-USER: CPT | Performed by: OTOLARYNGOLOGY

## 2024-10-08 PROCEDURE — 1160F RVW MEDS BY RX/DR IN RCRD: CPT | Performed by: OTOLARYNGOLOGY

## 2024-10-08 NOTE — PROGRESS NOTES
"HPI  Horacio Venegas is a 78 y.o. male follow-up laryngopharyngeal reflux.  He is no longer having pain.  He has done very well since last seen without symptoms.  He continues to take his antiacid once nightly.    Prior history: Follow-up ear pain face pain.  No further pain.  He has some mild congestion in his throat but other than that is feeling normal.  He continues the antiacid.  He started some herbal supplements for his sense of taste    Prior history: Follow-up CT scan of the neck.  No lymphadenopathy or masses seen.  My review of the images demonstrated what appeared to be some small tonsil stones in the left tonsil area inferiorly.  He is using antiacid.  He is improving slightly    Prior history: With about a month of right-sided ear pain and sore throat.  He also has unusual facial pain extending to the head.  This is older.  He has been on antibiotics and prednisone and symptoms persist.  Voice is strong.  Breathing well.  No hemoptysis.      History reviewed. No pertinent past medical history.         Medications:     Current Outpatient Medications:     aspirin 81 mg capsule, Take 81 mg by mouth once every 24 hours., Disp: , Rfl:     atenolol (Tenormin) 25 mg tablet, Take 1 tablet (25 mg) by mouth once every 24 hours., Disp: , Rfl:     multivitamin with minerals iron-free (Centrum Silver), as directed Orally, Disp: , Rfl:     omeprazole OTC (PriLOSEC OTC) 20 mg EC tablet, Take 1 tablet (20 mg) by mouth once daily in the morning. Take before meals. Do not crush, chew, or split., Disp: , Rfl:      Allergies:  Allergies   Allergen Reactions    Penicillin Dizziness    Sulfa (Sulfonamide Antibiotics) Dizziness        Physical Exam:  Last Recorded Vitals  Temperature 36 °C (96.8 °F), height 1.778 m (5' 10\"), weight 78 kg (172 lb).  General:     General appearance: Well-developed, well-nourished in no acute distress.       Voice:  normal       Head/face: Normal appearance; nontender to palpation     Facial " nerve function: Normal and symmetric bilaterally.    Oral/oropharynx:     Oral vestibule: Normal labial and gingival mucosa     Tongue/floor of mouth: Normal without lesion     Oropharynx: Clear.  No lesions present of the hard/soft palate, posterior pharynx.  2+ tonsil tissue.  Symmetric.  Nothing suspicious    Neck:     Neck: Normal appearance, trachea midline     Salivary glands: Normal to palpation bilaterally     Lymph nodes: No cervical lymphadenopathy to palpation     Thyroid: No thyromegaly.  No palpable nodules     Range of motion: Normal    Neurological:     Cortical functions: Alert and oriented x3, appropriate affect       Larynx/hypopharynx:     Laryngeal findings: Mirror exam inadequate or limited secondary to enlarged base of tongue and/or excessive gagging.      Ear:     Ear canal: Normal bilaterally     Tympanic membrane: Intact and mobile bilaterally     Pinna: Normal bilaterally     Hearing:  Gross hearing assessment normal by voice    Nose:     Visualized using: Anterior rhinoscopy     Nasopharynx: Inadequate mirror exam secondary to gag, anatomy.       Nasal dorsum: Nontraumatic midline appearance     Septum: Midline     Inferior turbinates: Normally sized     Mucosa: Bilateral, pink, normal appearing       ASSESSMENT/PLAN:  He has been asymptomatic.  He continues to do well.  I have recommended that he wean his antiacid and see if his symptoms remain absent.  If he is able to discontinue this I will see him back as needed and if he continues to use the antiacid, I will see him back in a year, sooner as needed      Ashutosh Tomas MD

## 2024-12-04 ENCOUNTER — OFFICE VISIT (OUTPATIENT)
Dept: PRIMARY CARE | Facility: CLINIC | Age: 79
End: 2024-12-04
Payer: MEDICARE

## 2024-12-04 ENCOUNTER — LAB (OUTPATIENT)
Dept: LAB | Facility: LAB | Age: 79
End: 2024-12-04
Payer: MEDICARE

## 2024-12-04 VITALS
BODY MASS INDEX: 24.71 KG/M2 | DIASTOLIC BLOOD PRESSURE: 68 MMHG | WEIGHT: 172.2 LBS | HEART RATE: 68 BPM | SYSTOLIC BLOOD PRESSURE: 126 MMHG

## 2024-12-04 DIAGNOSIS — M25.562 ACUTE PAIN OF LEFT KNEE: Primary | ICD-10-CM

## 2024-12-04 DIAGNOSIS — M79.605 LEFT LEG PAIN: ICD-10-CM

## 2024-12-04 LAB — D DIMER PPP FEU-MCNC: 477 NG/ML FEU

## 2024-12-04 PROCEDURE — 99213 OFFICE O/P EST LOW 20 MIN: CPT | Performed by: FAMILY MEDICINE

## 2024-12-04 PROCEDURE — 1159F MED LIST DOCD IN RCRD: CPT | Performed by: FAMILY MEDICINE

## 2024-12-04 PROCEDURE — 1125F AMNT PAIN NOTED PAIN PRSNT: CPT | Performed by: FAMILY MEDICINE

## 2024-12-04 PROCEDURE — 36415 COLL VENOUS BLD VENIPUNCTURE: CPT

## 2024-12-04 PROCEDURE — 1036F TOBACCO NON-USER: CPT | Performed by: FAMILY MEDICINE

## 2024-12-04 RX ORDER — MELOXICAM 7.5 MG/1
7.5-15 TABLET ORAL DAILY
Qty: 30 TABLET | Refills: 11 | Status: SHIPPED | OUTPATIENT
Start: 2024-12-04 | End: 2025-12-04

## 2024-12-04 RX ORDER — SODIUM PICOSULFATE, MAGNESIUM OXIDE, AND ANHYDROUS CITRIC ACID 12; 3.5; 1 G/175ML; G/175ML; MG/175ML
LIQUID ORAL
COMMUNITY
Start: 2024-08-24 | End: 2024-12-04 | Stop reason: ALTCHOICE

## 2024-12-04 ASSESSMENT — PAIN SCALES - GENERAL: PAINLEVEL_OUTOF10: 6

## 2024-12-04 NOTE — PROGRESS NOTES
Subjective   Patient ID: Horacio Venegas is a 79 y.o. male who presents for left lower leg pain when walking; aches.    HPI   He has pain in his left knee and leg over the last week and a half.  No injury.  He states if he is been sitting for a while he first gets up it is quite painful.  As he gets going it gets better.  It will ache sometimes at night when he is lying there as well.  Can ache into the calf as well.  No swelling of the foot.  He said no periods of long-term immobilization.    Review of Systems    Objective   /68   Pulse 68   Wt 78.1 kg (172 lb 3.2 oz)   BMI 24.71 kg/m²     Physical Exam  He is alert, no apparent stress, his affect is pleasant.  Respirations are easy.  There is no swelling of either extremity.  Does have some tenderness with palpation into the posterior proximal calf.  Negative Barrientos test.  Some tenderness with palpation of the medial and lateral knee.    Assessment/Plan   Diagnoses and all orders for this visit:  Acute pain of left knee  Left leg pain  -     D-dimer, quantitative; Future  This is likely musculoskeletal given that it hurts after its gotten time to get stiff but improves as he moves.  I encouraged him to use heat on his.  Will get the D-dimer test just to make sure that there is no indication of a clot.  She will schedule physical therapy let me know if this is not improving over the next 2 weeks.  He can also use the meloxicam once or twice a day as needed for the discomfort.  Hopefully once he is has physical therapy he will get to recover and can stop that.

## 2024-12-04 NOTE — PATIENT INSTRUCTIONS
Get the blood test today to test to be sure you don't have a blood clot.   Less likely to be veins/circulation since it's better the more you move.  Set up with physical therapy and let me know how you do over the next two weeks.   You may take the meloxicam 1-2 tablet per day over the next 1-2 weeks.

## 2024-12-13 ENCOUNTER — EVALUATION (OUTPATIENT)
Dept: PHYSICAL THERAPY | Facility: HOSPITAL | Age: 79
End: 2024-12-13
Payer: MEDICARE

## 2024-12-13 DIAGNOSIS — M79.605 LEFT LEG PAIN: ICD-10-CM

## 2024-12-13 DIAGNOSIS — M25.562 ACUTE PAIN OF LEFT KNEE: Primary | ICD-10-CM

## 2024-12-13 PROCEDURE — 97110 THERAPEUTIC EXERCISES: CPT | Mod: GP | Performed by: PHYSICAL THERAPIST

## 2024-12-13 PROCEDURE — 97161 PT EVAL LOW COMPLEX 20 MIN: CPT | Mod: GP | Performed by: PHYSICAL THERAPIST

## 2024-12-13 ASSESSMENT — ENCOUNTER SYMPTOMS
OCCASIONAL FEELINGS OF UNSTEADINESS: 0
LOSS OF SENSATION IN FEET: 0
DEPRESSION: 0

## 2024-12-13 ASSESSMENT — PAIN SCALES - GENERAL: PAINLEVEL_OUTOF10: 1

## 2024-12-13 ASSESSMENT — PAIN - FUNCTIONAL ASSESSMENT: PAIN_FUNCTIONAL_ASSESSMENT: 0-10

## 2024-12-13 ASSESSMENT — PAIN DESCRIPTION - DESCRIPTORS: DESCRIPTORS: ACHING

## 2024-12-13 NOTE — PROGRESS NOTES
Physical Therapy  Physical Therapy Evaluation and Treatment    Patient Name: Horacio Venegas  MRN: [id number]  Today's Date: 12/13/2024  Time Calculation  Start Time: 1451  Stop Time: 1530  Time Calculation (min): 39 min    Today's Charges  PT Evaluation Time Entry  PT Evaluation (Low) Time Entry: 29  PT Therapeutic Procedures Time Entry  Therapeutic Exercise Time Entry: 10                   Insurance:  Visit number: 1 of 4  Authorization info: no auth required  Payor: MEDICARE / Plan: MEDICARE PART A AND B / Product Type: *No Product type* /     Current Problem  1. Acute pain of left knee  Referral to Physical Therapy    Follow Up In Physical Therapy      2. Left leg pain  Referral to Physical Therapy    Follow Up In Physical Therapy        Problem List Items Addressed This Visit             ICD-10-CM    Acute pain of left knee - Primary M25.562    Relevant Orders    Follow Up In Physical Therapy    Left leg pain M79.605    Relevant Orders    Follow Up In Physical Therapy       General:  General  Reason for Referral: left knee pain  Referred By: Dr. RAFY Hubbard    Precautions:   Precautions  STEADI Fall Risk Score (The score of 4 or more indicates an increased risk of falling): 0  Medical Precautions: No known precautions/limitation    Medical History Form: Reviewed (scanned into chart)    Subjective:   Subjective   Chief Complaint: Patient is a 79 year old male who presents to clinic with complaints of left knee pain. Patient has a prior medical history including: SVT, tachycardia, left lower extremity pain, left knee pain.  Onset Date: 12/4/2024  AMEE: Chronic    Current Condition:   Better    Pain:  Pain Assessment: 0-10  0-10 (Numeric) Pain Score: 1  Pain Location: Knee  Pain Orientation: Left  Pain Descriptors: Aching  Highest: 7/10 pain  Lowest: 1/10 pain  Aggravating Factors:  Sitting  Relieving Factors:  walking    Relevant Information (PMH & Previous Tests/Imaging): no imaging  Previous  Interventions/Treatments: None    Prior Level of Function (PLOF)  Patient previously independent with all ADLs  Exercise/Physical Activity: house work, yard work  Work/School: retired  Hobbies: none stated    Patients Living Environment: Reviewed and no concern    Primary Language: English    Patient's Goal(s) for Therapy: Keep the pain away.    Red Flags: Do you have any of the following? No  Fever/chills, unexplained weight changes, dizziness/fainting, unexplained change in bowel or bladder functions, unexplained malaise or muscle weakness, night pain/sweats, numbness or tingling    Objective:  Objective     Hip PROM  R hip flexion: (125°): min restriction  L hip flexion: (125°): min restriction  R hip abduction: (45°): WFL  L hip abduction: (45°): WFL  R hip ER: (45°): WFL  L hip ER: (45°): WFL  R hip IR: (45°): mod restriction  L hip IR: (45°): mod restriction    Specific Lower Extremity MMT  R Iliopsoas: (5/5): 4+/5  L Iliopsoas: (5/5): 4+/5    Functional Rating Scale  LEFS /80: 36    Knee AROM  R knee flexion: (140°): 125  L knee flexion: (140°): 125  R knee extension: (0°): 0  L knee extension: (0°): 0    Knee MMT  R knee flexion: (5/5): 5/5  L knee flexion: (5/5): 5/5  R knee extension: (5/5): 5/5  L knee extension: (5/5): 5/5    Ankle AROM  R ankle dorsiflexion: (10°): 3 (16 with knees flexed)  L ankle dorsiflexion: (10°): 4 (13 with knees flexed)    Ankle MMT  R ankle dorsiflexion: (5/5): 5/5  L ankle dorsiflexion: (5/5): 5/5      Functional Screening    Posture: decreased lumbar lordosis    Lower Extremity Functional Movements  Transfers: Independent  Gait: none  Assistive Device: no device  DL Squat: WFL  SL Squat: not tested     Balance  Not tested     Palpation: increased muscle tension to left gastroc and hamstrings    Joint Mobility: WFL    Flexibility: impaired hamstring, gastroc, and piriformis muscles      Special Tests    Hakeem Test: -  NOÉ Test: +    Ligament Tests:              Anterior  "Drawer Test: -   Posterior Drawer Test: -   Valgus Stress Test: -   Varus Stress Test: -  Meniscus   Lane Test: -  Patella   Apprehension Test: -   Grind Test: -    Outcome Measures:  Other Measures  Lower Extremity Funtional Score (LEFS): 36/80     Treatment Performed:  Therapeutic Exercise  Therapeutic Exercise Performed: Yes  Therapeutic Exercise Activity 1: figure 4 piriformis stretch 2x20\"  Therapeutic Exercise Activity 2: SLR x5  Therapeutic Exercise Activity 3: hamstring stretch 2x20\"  Therapeutic Exercise Activity 4: gastroc stretch 2x20\"    EDUCATION:   Individual(s) Educated: patient   Education Provided: Home exercise program, plan of care, activity modifications, pain management, and injury pathology  Handout(s) Provided: Scanned into chart  Home Program: [id number]  Risk and Benefits Discussed with Patient/Caregiver/Other: Yes   Patient/Caregiver Demonstrated Understanding: Yes   Plan of Care Discussed and Agreed Upon: Yes   Patient Response to Education: Patient/Caregiver verbalized understanding of information, Patient/Caregiver performed return demonstration of exercises/activities, and Patient/Caregiver asked appropriate questions    Assessment: Patient presents with signs and symptoms consistent with impaired flexibility and range of motion resulting in limited participation in pain-free ADLs and inability to perform at their prior level of function. Skilled PT warranted to address the above stated impairments, so the patient can perform FA's without increased pain or difficulty.    PT Assessment Results: Decreased range of motion, Pain  Rehab Prognosis: Good  Evaluation/Treatment Tolerance: Patient tolerated treatment well    Complexity: low    Plan:  Treatment/Interventions: Education/ Instruction, Gait training, Manual therapy, Neuromuscular re-education, Therapeutic activities, Therapeutic exercises  PT Plan: Skilled PT  PT Frequency: 1 time per week  Duration: 4 weeks  Onset Date: " 12/04/24  Certification Period Start Date: 12/13/24  Certification Period End Date: 01/10/25  Number of Treatments Authorized: 1 of 4  Rehab Potential: Good  Plan of Care Agreement: Patient    Goals: Set and discussed today  Active       PT Problem       Patient will demonstrate improved hamstring flexibility in bilateral lower extremities WFL.       Start:  12/13/24    Expected End:  01/10/25            Patient will demonstrate improved gastrocnemius flexibility in bilateral lower extremities WFL.       Start:  12/13/24    Expected End:  01/10/25            Patient will demonstrate improved piriformis flexibility in bilateral lower extremities WFL.       Start:  12/13/24    Expected End:  01/10/25            Patient will achieve bilateral hip internal rotation ROM WFL degrees in 90 degrees hip flexion       Start:  12/13/24    Expected End:  01/10/25            Patient will achieve bilateral ankle  dorsiflexion ROM at least 10 degrees with knees extended.       Start:  12/13/24    Expected End:  01/10/25            Patient will demonstrate independence in home program for support of progression       Start:  12/13/24    Expected End:  01/10/25            Patient will show a significant change in LEFS (36 to 45) patient reported outcome tool to demonstrate subjective imporovement       Start:  12/13/24    Expected End:  01/10/25                Plan of care was developed with input and agreement by the patient    Ambulatory Screenings Summary       Screening  Frequency  Date Last Completed   Spiritual and Cultural Beliefs   Screening  each visit or episode of care 12/4/2024   Falls Risk Screening  every ambulatory visit 12/13/2024    Pain Screening  annually at primary care visit  12/4/2024   Domestic Violence screening  annually at primary care visit 12/4/2024   Elder Abuse Screening  annually at primary care visit 12/4/2024   Depression Screening  annually in the primary care setting 12/4/2024   Suicide Risk  Screening  annually in the primary care setting    Nutrition and Food Insecurity   Screening  at least annually at primary care visit     Key Learner  annually in the primary care setting 12/4/2024   Drug Screen  12/4/2024 11:19 AM   Alcohol Screen  12/4/2024 11:19 AM   Advance Directive  12/4/2024         Huy Loyd, PT

## 2025-03-20 ENCOUNTER — DOCUMENTATION (OUTPATIENT)
Dept: PHYSICAL THERAPY | Facility: HOSPITAL | Age: 80
End: 2025-03-20
Payer: MEDICARE

## 2025-03-20 NOTE — PROGRESS NOTES
Physical Therapy    Discharge Summary    Name: Horacio Venegas  MRN: [id number]  : 1945  Date: 25    Discharge Summary: PT    Discharge Information: Date of discharge 3/20/2025, Date of last visit 2024, Date of evaluation 2024, Number of attended visits 1, Referred by Dr. RAFY Hubbard, and Referred for left knee pain    Therapy Summary: Patient is a 79 year old male who presents to clinic with complaints of left knee pain. Patient has a prior medical history including: SVT, tachycardia, left lower extremity pain, left knee pain.     Discharge Status: Patient presents with signs and symptoms consistent with impaired flexibility and range of motion resulting in limited participation in pain-free ADLs and inability to perform at their prior level of function. Skilled PT warranted to address the above stated impairments, so the patient can perform FA's without increased pain or difficulty.      Rehab Discharge Reason: Failed to schedule and/or keep follow-up appointment(s)

## 2025-04-23 ENCOUNTER — TELEPHONE (OUTPATIENT)
Dept: PRIMARY CARE | Facility: CLINIC | Age: 80
End: 2025-04-23

## 2025-04-23 ENCOUNTER — OFFICE VISIT (OUTPATIENT)
Dept: PRIMARY CARE | Facility: CLINIC | Age: 80
End: 2025-04-23
Payer: MEDICARE

## 2025-04-23 VITALS
DIASTOLIC BLOOD PRESSURE: 80 MMHG | HEIGHT: 69 IN | SYSTOLIC BLOOD PRESSURE: 152 MMHG | WEIGHT: 179 LBS | HEART RATE: 70 BPM | BODY MASS INDEX: 26.51 KG/M2

## 2025-04-23 DIAGNOSIS — Z00.00 ROUTINE GENERAL MEDICAL EXAMINATION AT HEALTH CARE FACILITY: Primary | ICD-10-CM

## 2025-04-23 DIAGNOSIS — N40.1 BENIGN PROSTATIC HYPERPLASIA WITH URINARY FREQUENCY: ICD-10-CM

## 2025-04-23 DIAGNOSIS — R35.0 BENIGN PROSTATIC HYPERPLASIA WITH URINARY FREQUENCY: ICD-10-CM

## 2025-04-23 DIAGNOSIS — Z86.0100 HISTORY OF COLONIC POLYPS: ICD-10-CM

## 2025-04-23 DIAGNOSIS — I47.10 SVT (SUPRAVENTRICULAR TACHYCARDIA) (CMS-HCC): ICD-10-CM

## 2025-04-23 PROCEDURE — 1036F TOBACCO NON-USER: CPT | Performed by: FAMILY MEDICINE

## 2025-04-23 PROCEDURE — 1159F MED LIST DOCD IN RCRD: CPT | Performed by: FAMILY MEDICINE

## 2025-04-23 PROCEDURE — 1126F AMNT PAIN NOTED NONE PRSNT: CPT | Performed by: FAMILY MEDICINE

## 2025-04-23 PROCEDURE — 1170F FXNL STATUS ASSESSED: CPT | Performed by: FAMILY MEDICINE

## 2025-04-23 PROCEDURE — 99215 OFFICE O/P EST HI 40 MIN: CPT | Performed by: FAMILY MEDICINE

## 2025-04-23 PROCEDURE — 1160F RVW MEDS BY RX/DR IN RCRD: CPT | Performed by: FAMILY MEDICINE

## 2025-04-23 PROCEDURE — G0439 PPPS, SUBSEQ VISIT: HCPCS | Performed by: FAMILY MEDICINE

## 2025-04-23 RX ORDER — TAMSULOSIN HYDROCHLORIDE 0.4 MG/1
0.4 CAPSULE ORAL DAILY
Qty: 30 CAPSULE | Refills: 5 | Status: SHIPPED | OUTPATIENT
Start: 2025-04-23 | End: 2026-04-23

## 2025-04-23 ASSESSMENT — ACTIVITIES OF DAILY LIVING (ADL)
DOING_HOUSEWORK: INDEPENDENT
MANAGING_FINANCES: INDEPENDENT
DRESSING: INDEPENDENT
GROCERY_SHOPPING: INDEPENDENT
TAKING_MEDICATION: INDEPENDENT
BATHING: INDEPENDENT

## 2025-04-23 ASSESSMENT — ENCOUNTER SYMPTOMS
UNEXPECTED WEIGHT CHANGE: 0
HEADACHES: 0
TROUBLE SWALLOWING: 0
PALPITATIONS: 0
SHORTNESS OF BREATH: 0
DIZZINESS: 0
DIFFICULTY URINATING: 0
SLEEP DISTURBANCE: 0
POLYDIPSIA: 0
BLOOD IN STOOL: 0
FREQUENCY: 1
FATIGUE: 0
DYSPHORIC MOOD: 0
CONSTIPATION: 0
NERVOUS/ANXIOUS: 0
CHEST TIGHTNESS: 0
DIARRHEA: 0
ABDOMINAL PAIN: 0

## 2025-04-23 ASSESSMENT — PATIENT HEALTH QUESTIONNAIRE - PHQ9
2. FEELING DOWN, DEPRESSED OR HOPELESS: NOT AT ALL
SUM OF ALL RESPONSES TO PHQ9 QUESTIONS 1 AND 2: 0
1. LITTLE INTEREST OR PLEASURE IN DOING THINGS: NOT AT ALL

## 2025-04-23 ASSESSMENT — COLUMBIA-SUICIDE SEVERITY RATING SCALE - C-SSRS
1. IN THE PAST MONTH, HAVE YOU WISHED YOU WERE DEAD OR WISHED YOU COULD GO TO SLEEP AND NOT WAKE UP?: NO
6. HAVE YOU EVER DONE ANYTHING, STARTED TO DO ANYTHING, OR PREPARED TO DO ANYTHING TO END YOUR LIFE?: NO
2. HAVE YOU ACTUALLY HAD ANY THOUGHTS OF KILLING YOURSELF?: NO

## 2025-04-23 ASSESSMENT — PAIN SCALES - GENERAL: PAINLEVEL_OUTOF10: 0-NO PAIN

## 2025-04-23 NOTE — TELEPHONE ENCOUNTER
Patient read the paperwork on the Flomax and it states if you are allergic to sulfa drugs to speak with your doctor first- the patients reaction is dizziness when taking sulfa- should he be concerned? Please advise

## 2025-04-23 NOTE — PATIENT INSTRUCTIONS
Check the blood test today.   You can start the tamsulosin and let me know how that does over the next three days. As we discussed, it can make you a little dizzy with standing initially (though this should resolve).

## 2025-04-23 NOTE — PROGRESS NOTES
"Subjective   Reason for Visit: Horacio Venegas is an 79 y.o. male here for a Medicare Wellness visit.     Past Medical, Surgical, and Family History reviewed and updated in chart.    Reviewed all medications by prescribing practitioner or clinical pharmacist (such as prescriptions, OTCs, herbal therapies and supplements) and documented in the medical record.    HPI  His blood pressure was 126/60 BP at home today.  He checks it regularly, because he also checks his wife.  He states that usually remains in the 120s to low 130s over 60s.  Colonoscopy up-to-date, having done in 2024.  Lab tests including CMP cholesterol were very good last year.    Patient Care Team:  Austyn Hubbard MD as PCP - General (Family Medicine)     Review of Systems   Constitutional:  Negative for fatigue and unexpected weight change.   HENT:  Negative for congestion and trouble swallowing.    Respiratory:  Negative for chest tightness and shortness of breath.    Cardiovascular:  Negative for chest pain, palpitations (very rarely) and leg swelling.   Gastrointestinal:  Negative for abdominal pain, blood in stool, constipation and diarrhea.   Endocrine: Negative for polydipsia and polyuria.   Genitourinary:  Positive for frequency (has to urinate every 2-3 hours during the day and is up 1-2 x at night.). Negative for difficulty urinating.   Neurological:  Negative for dizziness and headaches.   Psychiatric/Behavioral:  Negative for dysphoric mood and sleep disturbance. The patient is not nervous/anxious.        Objective   Vitals:  /80   Pulse 70   Ht 1.759 m (5' 9.25\")   Wt 81.2 kg (179 lb)   BMI 26.24 kg/m²       Physical Exam  Vitals reviewed.   Constitutional:       Appearance: Normal appearance.   HENT:      Right Ear: Tympanic membrane, ear canal and external ear normal.      Left Ear: Tympanic membrane, ear canal and external ear normal.      Nose: Nose normal.      Mouth/Throat:      Mouth: Mucous membranes are moist.      " Pharynx: Oropharynx is clear.   Eyes:      Conjunctiva/sclera: Conjunctivae normal.   Neck:      Thyroid: No thyromegaly or thyroid tenderness.      Vascular: No carotid bruit.   Cardiovascular:      Rate and Rhythm: Normal rate and regular rhythm.      Heart sounds: No murmur heard.  Pulmonary:      Effort: Pulmonary effort is normal.      Breath sounds: Normal breath sounds.   Abdominal:      General: Abdomen is flat.      Palpations: Abdomen is soft. There is no mass.      Tenderness: There is no abdominal tenderness.   Musculoskeletal:      Cervical back: Neck supple.      Right lower leg: No edema.      Left lower leg: No edema.   Lymphadenopathy:      Cervical: No cervical adenopathy.   Skin:     General: Skin is warm and dry.   Neurological:      Mental Status: He is alert.   Psychiatric:         Mood and Affect: Mood normal.         Assessment & Plan  Routine general medical examination at health care facility  Overall, doing well send for the increased urination.  Will give a trial of Flomax and check a PSA today.  He will let me know how effective that is over the next 3 weeks.  If not resolving with that, we will have him see urology.  Up-to-date on his colonoscopy.  Since we are checking a PSA, we will also do the CMP.  Cholesterol is excellent last year so we do not need to check that.  Blood pressures are very good at home so we will continue to hold medications.  He can follow-up with me in 1 year.  Orders:    1 Year Follow Up In Primary Care - Wellness Exam; Future    SVT (supraventricular tachycardia) (CMS-HCC)         History of colonic polyps         Benign prostatic hyperplasia with urinary frequency

## 2025-04-24 DIAGNOSIS — E87.5 HYPERKALEMIA: Primary | ICD-10-CM

## 2025-04-24 LAB
ALBUMIN SERPL-MCNC: 4.4 G/DL (ref 3.6–5.1)
ALP SERPL-CCNC: 84 U/L (ref 35–144)
ALT SERPL-CCNC: 14 U/L (ref 9–46)
ANION GAP SERPL CALCULATED.4IONS-SCNC: 7 MMOL/L (CALC) (ref 7–17)
AST SERPL-CCNC: 21 U/L (ref 10–35)
BILIRUB SERPL-MCNC: 1.3 MG/DL (ref 0.2–1.2)
BUN SERPL-MCNC: 20 MG/DL (ref 7–25)
CALCIUM SERPL-MCNC: 9.8 MG/DL (ref 8.6–10.3)
CHLORIDE SERPL-SCNC: 102 MMOL/L (ref 98–110)
CO2 SERPL-SCNC: 29 MMOL/L (ref 20–32)
CREAT SERPL-MCNC: 1.05 MG/DL (ref 0.7–1.28)
EGFRCR SERPLBLD CKD-EPI 2021: 73 ML/MIN/1.73M2
GLUCOSE SERPL-MCNC: 90 MG/DL (ref 65–99)
POTASSIUM SERPL-SCNC: 5.4 MMOL/L (ref 3.5–5.3)
PROT SERPL-MCNC: 7.7 G/DL (ref 6.1–8.1)
PSA SERPL-MCNC: 0.91 NG/ML
SODIUM SERPL-SCNC: 138 MMOL/L (ref 135–146)

## 2025-05-01 DIAGNOSIS — E87.5 HYPERKALEMIA: ICD-10-CM

## 2025-05-06 LAB — POTASSIUM SERPL-SCNC: 4.8 MMOL/L (ref 3.5–5.3)

## 2025-07-28 ENCOUNTER — TELEPHONE (OUTPATIENT)
Dept: PRIMARY CARE | Facility: CLINIC | Age: 80
End: 2025-07-28
Payer: MEDICARE

## 2025-07-28 DIAGNOSIS — Z74.09 POOR MOBILITY: Primary | ICD-10-CM

## 2025-09-02 ENCOUNTER — APPOINTMENT (OUTPATIENT)
Dept: OTOLARYNGOLOGY | Facility: CLINIC | Age: 80
End: 2025-09-02
Payer: MEDICARE